# Patient Record
Sex: FEMALE | Race: WHITE | NOT HISPANIC OR LATINO | URBAN - METROPOLITAN AREA
[De-identification: names, ages, dates, MRNs, and addresses within clinical notes are randomized per-mention and may not be internally consistent; named-entity substitution may affect disease eponyms.]

---

## 2017-02-17 ENCOUNTER — TELEPHONE (OUTPATIENT)
Dept: INTERNAL MEDICINE | Facility: CLINIC | Age: 52
End: 2017-02-17

## 2017-02-17 DIAGNOSIS — E78.5 HYPERLIPIDEMIA, UNSPECIFIED HYPERLIPIDEMIA TYPE: Primary | ICD-10-CM

## 2017-02-17 LAB
ALBUMIN SERPL-MCNC: 4.2 G/DL (ref 3.5–5.2)
ALBUMIN/GLOB SERPL: 1.5 G/DL
ALP SERPL-CCNC: 67 U/L (ref 39–117)
ALT SERPL-CCNC: 18 U/L (ref 1–33)
AST SERPL-CCNC: 15 U/L (ref 1–32)
BILIRUB SERPL-MCNC: 0.3 MG/DL (ref 0.1–1.2)
BUN SERPL-MCNC: 17 MG/DL (ref 6–20)
BUN/CREAT SERPL: 21.5 (ref 7–25)
CALCIUM SERPL-MCNC: 9.7 MG/DL (ref 8.6–10.5)
CHLORIDE SERPL-SCNC: 103 MMOL/L (ref 98–107)
CHOLEST SERPL-MCNC: 233 MG/DL (ref 0–200)
CO2 SERPL-SCNC: 25.9 MMOL/L (ref 22–29)
CREAT SERPL-MCNC: 0.79 MG/DL (ref 0.57–1)
GLOBULIN SER CALC-MCNC: 2.8 GM/DL
GLUCOSE SERPL-MCNC: 90 MG/DL (ref 65–99)
HDLC SERPL-MCNC: 68 MG/DL (ref 40–60)
LDLC SERPL CALC-MCNC: 146 MG/DL (ref 0–100)
LDLC/HDLC SERPL: 2.15 {RATIO}
POTASSIUM SERPL-SCNC: 4.4 MMOL/L (ref 3.5–5.2)
PROT SERPL-MCNC: 7 G/DL (ref 6–8.5)
SODIUM SERPL-SCNC: 142 MMOL/L (ref 136–145)
TRIGL SERPL-MCNC: 95 MG/DL (ref 0–150)
TSH SERPL DL<=0.005 MIU/L-ACNC: 2.89 MIU/ML (ref 0.27–4.2)
VLDLC SERPL CALC-MCNC: 19 MG/DL (ref 5–40)

## 2017-02-17 NOTE — TELEPHONE ENCOUNTER
LABS ORDERED    ----- Message from Janis Dominguez MD sent at 2/16/2017  8:23 AM EST -----  [facility]p and t[facility]  ----- Message -----     From: Brea Fu MA     Sent: 2/16/2017   7:40 AM       To: Janis Dominguez MD    PT IS SCHEDULED FOR LABS PLEASE ADVISE

## 2017-02-21 ENCOUNTER — OFFICE VISIT (OUTPATIENT)
Dept: INTERNAL MEDICINE | Facility: CLINIC | Age: 52
End: 2017-02-21

## 2017-02-21 VITALS
SYSTOLIC BLOOD PRESSURE: 124 MMHG | BODY MASS INDEX: 34.71 KG/M2 | HEART RATE: 74 BPM | HEIGHT: 64 IN | OXYGEN SATURATION: 98 % | DIASTOLIC BLOOD PRESSURE: 72 MMHG | WEIGHT: 203.3 LBS

## 2017-02-21 DIAGNOSIS — J45.30 MILD PERSISTENT ASTHMA WITHOUT COMPLICATION: ICD-10-CM

## 2017-02-21 DIAGNOSIS — F41.1 GENERALIZED ANXIETY DISORDER: ICD-10-CM

## 2017-02-21 DIAGNOSIS — K21.9 GASTROESOPHAGEAL REFLUX DISEASE, ESOPHAGITIS PRESENCE NOT SPECIFIED: ICD-10-CM

## 2017-02-21 DIAGNOSIS — E78.00 HYPERCHOLESTEROLEMIA: Primary | ICD-10-CM

## 2017-02-21 DIAGNOSIS — M25.512 LEFT SHOULDER PAIN, UNSPECIFIED CHRONICITY: ICD-10-CM

## 2017-02-21 PROCEDURE — 99214 OFFICE O/P EST MOD 30 MIN: CPT | Performed by: INTERNAL MEDICINE

## 2017-02-21 PROCEDURE — 90471 IMMUNIZATION ADMIN: CPT | Performed by: INTERNAL MEDICINE

## 2017-02-21 PROCEDURE — 90732 PPSV23 VACC 2 YRS+ SUBQ/IM: CPT | Performed by: INTERNAL MEDICINE

## 2017-02-21 RX ORDER — ESTRADIOL 0.5 MG/1
0.5 TABLET ORAL DAILY
Qty: 90 TABLET | Refills: 3 | Status: SHIPPED | OUTPATIENT
Start: 2017-02-21 | End: 2018-05-03

## 2017-02-21 RX ORDER — MELOXICAM 7.5 MG/1
7.5 TABLET ORAL DAILY
Qty: 30 TABLET | Refills: 2 | Status: SHIPPED | OUTPATIENT
Start: 2017-02-21 | End: 2017-06-17 | Stop reason: SDUPTHER

## 2017-02-21 NOTE — PROGRESS NOTES
Subjective   Shelbi Fleming is a 52 y.o. female here today to f/u on hyperlipidemia.      History of Present Illness   Pt has been compliant with meds for GERD.  No sx as long as pt takes medicine as prescribed.  No epigastric pain or reflux sx  She has stopped her atorvastatin as she did not want to take it  No trouble with it  She has has been on estrace since her hysterectomy.  She has been taking effexor qod and does well with this  She has been stable with her advair inhaler  She does have some left shoulder pain for 6 months  Seems to come and go.  No night pain.  Worse with reaching back or over her head    The following portions of the patient's history were reviewed and updated as appropriate: allergies, current medications, past medical history, past social history and problem list.  She has been working on diet and exercise    Review of Systems   All other systems reviewed and are negative.      Objective   Physical Exam   Constitutional: She is oriented to person, place, and time. She appears well-developed and well-nourished.   HENT:   Head: Normocephalic and atraumatic.   Right Ear: External ear normal.   Left Ear: External ear normal.   Mouth/Throat: Oropharynx is clear and moist.   Eyes: Conjunctivae and EOM are normal. Pupils are equal, round, and reactive to light.   Neck: Normal range of motion. No tracheal deviation present. No thyromegaly present.   Cardiovascular: Normal rate, regular rhythm, normal heart sounds and intact distal pulses.    Pulmonary/Chest: Effort normal and breath sounds normal.   Abdominal: Soft. Bowel sounds are normal. She exhibits no distension. There is no tenderness.   Musculoskeletal: Normal range of motion. She exhibits no edema or deformity.   Neurological: She is alert and oriented to person, place, and time.   Skin: Skin is warm and dry.   Psychiatric: She has a normal mood and affect. Her behavior is normal. Judgment and thought content normal.   Vitals  reviewed.      Vitals:    02/21/17 1352   BP: 124/72   Pulse: 74   SpO2: 98%        Telephone on 02/17/2017   Component Date Value Ref Range Status   • Glucose 02/17/2017 90  65 - 99 mg/dL Final   • BUN 02/17/2017 17  6 - 20 mg/dL Final   • Creatinine 02/17/2017 0.79  0.57 - 1.00 mg/dL Final   • eGFR Non  Am 02/17/2017 76  >60 mL/min/1.73 Final   • eGFR African Am 02/17/2017 92  >60 mL/min/1.73 Final   • BUN/Creatinine Ratio 02/17/2017 21.5  7.0 - 25.0 Final   • Sodium 02/17/2017 142  136 - 145 mmol/L Final   • Potassium 02/17/2017 4.4  3.5 - 5.2 mmol/L Final   • Chloride 02/17/2017 103  98 - 107 mmol/L Final   • Total CO2 02/17/2017 25.9  22.0 - 29.0 mmol/L Final   • Calcium 02/17/2017 9.7  8.6 - 10.5 mg/dL Final   • Total Protein 02/17/2017 7.0  6.0 - 8.5 g/dL Final   • Albumin 02/17/2017 4.20  3.50 - 5.20 g/dL Final   • Globulin 02/17/2017 2.8  gm/dL Final   • A/G Ratio 02/17/2017 1.5  g/dL Final   • Total Bilirubin 02/17/2017 0.3  0.1 - 1.2 mg/dL Final   • Alkaline Phosphatase 02/17/2017 67  39 - 117 U/L Final   • AST (SGOT) 02/17/2017 15  1 - 32 U/L Final   • ALT (SGPT) 02/17/2017 18  1 - 33 U/L Final   • Total Cholesterol 02/17/2017 233* 0 - 200 mg/dL Final   • Triglycerides 02/17/2017 95  0 - 150 mg/dL Final   • HDL Cholesterol 02/17/2017 68* 40 - 60 mg/dL Final   • VLDL Cholesterol 02/17/2017 19  5 - 40 mg/dL Final   • LDL Cholesterol  02/17/2017 146* 0 - 100 mg/dL Final   • LDL/HDL Ratio 02/17/2017 2.15   Final   • TSH 02/17/2017 2.89  0.27 - 4.2 mIU/mL Final       Current Outpatient Prescriptions:   •  albuterol (PROAIR HFA) 108 (90 BASE) MCG/ACT inhaler, Inhale 2 puffs every 6 (six) hours as needed for wheezing., Disp: 18 g, Rfl: 3  •  cetirizine (ZyrTEC) 10 MG tablet, Take 10 mg by mouth daily., Disp: , Rfl:   •  Cholecalciferol (VITAMIN D3) 2000 UNITS tablet, Take 2 tablets by mouth daily., Disp: , Rfl:   •  estradiol (ESTRACE) 0.5 MG tablet, Take 1 tablet by mouth Daily., Disp: 90 tablet, Rfl:  3  •  fluticasone-salmeterol (ADVAIR DISKUS) 100-50 MCG/DOSE DISKUS, Inhale 1 puff 2 (two) times a day., Disp: 60 each, Rfl: 3  •  Multiple Vitamin (MULTI VITAMIN DAILY PO), Take 1 tablet by mouth daily., Disp: , Rfl:   •  venlafaxine XR (EFFEXOR-XR) 37.5 MG 24 hr capsule, Take 1 capsule by mouth daily., Disp: , Rfl:   •  esomeprazole (nexIUM) 20 MG capsule, Take 2 capsules by mouth Every Morning Before Breakfast., Disp: 30 capsule, Rfl: 2  •  meloxicam (MOBIC) 7.5 MG tablet, Take 1 tablet by mouth Daily., Disp: 30 tablet, Rfl: 2      Assessment/Plan   Diagnoses and all orders for this visit:    Hypercholesterolemia    Gastroesophageal reflux disease, esophagitis presence not specified    Mild persistent asthma without complication    Generalized anxiety disorder    Left shoulder pain, unspecified chronicity  -     Ambulatory Referral to Physical Therapy Evaluate and treat  -     meloxicam (MOBIC) 7.5 MG tablet; Take 1 tablet by mouth Daily.    Other orders  -     esomeprazole (nexIUM) 20 MG capsule; Take 2 capsules by mouth Every Morning Before Breakfast.  -     estradiol (ESTRACE) 0.5 MG tablet; Take 1 tablet by mouth Daily.        1. Left shoulder pain- she is going to try PT and mobic.  She will let me know she is not improving and we'll send her to see the orthopedic surgeon  2. GERD-She is taking 2 OTC as her insurance will not cover  3. HPL-She is going to really work on diet and exercise and recheck  4. Anxiety-patient has done very well taking the Effexor every other day.  We are going to continue this  5.  Post menopause: Patient has been on Estrace for a few years.  We did discuss discontinuing it in the future but for now she is going to continue taking it as it is a low dose.

## 2017-03-07 ENCOUNTER — APPOINTMENT (OUTPATIENT)
Dept: PHYSICAL THERAPY | Facility: HOSPITAL | Age: 52
End: 2017-03-07

## 2017-03-15 ENCOUNTER — HOSPITAL ENCOUNTER (OUTPATIENT)
Dept: PHYSICAL THERAPY | Facility: HOSPITAL | Age: 52
Setting detail: THERAPIES SERIES
Discharge: HOME | End: 2017-03-15

## 2017-03-15 DIAGNOSIS — M77.8 SHOULDER TENDONITIS, LEFT: ICD-10-CM

## 2017-03-15 DIAGNOSIS — M25.512 CHRONIC LEFT SHOULDER PAIN: Primary | ICD-10-CM

## 2017-03-15 DIAGNOSIS — G89.29 CHRONIC LEFT SHOULDER PAIN: Primary | ICD-10-CM

## 2017-03-15 PROCEDURE — 97161 PT EVAL LOW COMPLEX 20 MIN: CPT

## 2017-03-15 PROCEDURE — 97530 THERAPEUTIC ACTIVITIES: CPT

## 2017-03-15 NOTE — PROGRESS NOTES
Outpatient Physical Therapy Ortho Initial Evaluation  [facility]     Patient Name: Shelbi Fleming  : 1965  MRN: [id number]  Today's Date: 3/15/2017      Visit Date: 03/15/2017    Patient Active Problem List   Diagnosis   • Atopic rhinitis   • Asthma   • Generalized anxiety disorder   • Gastroesophageal reflux disease   • Hypercholesterolemia   • Low back pain   • Adiposity   • Post-menopause        Past Medical History   Diagnosis Date   • Anxiety    • Asthma    • Elevated blood pressure reading without diagnosis of hypertension    • Hypercholesteremia    • Hyperglycemia    • Obesity    • Vitamin D deficiency         Past Surgical History   Procedure Laterality Date   • Colonoscopy     • Breast biopsy     • Hysterectomy         Visit Dx:     ICD-10-CM ICD-9-CM   1. Chronic left shoulder pain M25.512 719.41    G89.29 338.29   2. Shoulder tendonitis, left M75.82 726.10             Patient History       03/15/17 1400          History    Chief Complaint Pain;Muscle weakness;Difficulty with daily activities  -GJ (r) KM (t) GJ (c)      Type of Pain Shoulder pain;Upper Extremity / Arm  -GJ (r) KM (t) GJ (c)      Date Current Problem(s) Began --   Intermittent for 1 yr  -GJ (r) KM (t) GJ (c)      Brief Description of Current Complaint Ms. Fleming is a 52 y.o. right handed female with a one year history of intermittent left shoulder pain. She stated problems with dressing, bathing, and grooming using the LUE. She has been using the RUE for her ADL's, and described a similar pain is beginning in her RUE.  She stated that her pain is currently 3/10 and worsens with shoulder elevation. She found that heat and ibuprofen help to decrease her pain.  She is also taking a muscle relaxer and has noticed it helping. Ms. Fleming denied any red flags, diabetes, or thyroid issues. She also denied any numbness and tingling in either upper extremity. She has not received any imaging or physical therapy for her current condition.  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  -GJ (r) KM (t) GJ (c)      Previous treatment for THIS PROBLEM Medication  -GJ (r) KM (t) GJ (c)      Onset Date- PT 3/15/17  -GJ (r) KM (t) GJ (c)      Patient/Caregiver Goals Relieve pain;Return to prior level of function  -GJ (r) KM (t) GJ (c)      Hand Dominance right-handed  -GJ (r) KM (t) GJ (c)      Occupation/sports/leisure activities babysit  -GJ (r) KM (t) GJ (c)      Patient seeing anyone else for problem(s)? No  -GJ (r) KM (t) GJ (c)      Pain     Pain Location Shoulder;Arm  -GJ (r) KM (t) GJ (c)      Pain at Present 3  -GJ (r) KM (t) GJ (c)      Pain at Best 2  -GJ (r) KM (t) GJ (c)      Pain at Worst 7  -GJ (r) KM (t) GJ (c)      Pain Frequency Intermittent  -GJ (r) KM (t) GJ (c)      Fall Risk Assessment    Any falls in the past year: No  -GJ (r) KM (t) GJ (c)      Daily Activities    Primary Language English  -GJ (r) KM (t) GJ (c)      Are you able to read Yes  -GJ (r) KM (t) GJ (c)      Are you able to write Yes  -GJ (r) KM (t) GJ (c)      How does patient learn best? Reading  -GJ (r) KM (t) GJ (c)      Teaching needs identified Management of Condition;Home Exercise Program  -GJ (r) KM (t) GJ (c)      Does patient have problems with the following? Anxiety  -GJ (r) KM (t) GJ (c)      Barriers to learning None  -GJ (r) KM (t) GJ (c)      Functional Status bathing;dressing;grooming  -GJ (r) KM (t) GJ (c)      Pt Participated in POC and Goals Yes  -GJ (r) KM (t) GJ (c)      Safety    Are you being hurt, hit, or frightened by anyone at home or in your life? No  -GJ (r) KM (t) GJ (c)      Are you being neglected by a caregiver No  -GJ (r) KM (t) ILENE (c)        User Key  (r) = Recorded By, (t) = Taken By, (c) = Cosigned By    Initials Name Provider Type    ILENE Ahumada, PT Physical Therapist    TABATHA Diza, PT Student PT Student                PT Ortho       03/15/17  1500    Subjective Pain    Able to rate subjective pain? yes  -GJ (r) KM (t) GJ (c)    Pre-Treatment Pain Level 3  -GJ (r) KM (t) GJ (c)    Posture/Observations    Alignment Options Forward head;Rounded shoulders;Scapular elevation  -GJ (r) KM (t) GJ (c)    Forward Head Moderate  -GJ (r) KM (t) GJ (c)    Rounded Shoulders Bilateral:;Moderate  -GJ (r) KM (t) GJ (c)    Scapular Elevation Bilateral:;Mild  -GJ (r) KM (t) GJ (c)    Quarter Clearing    Quarter Clearing Upper Quarter Clearing  -GJ (r) KM (t) GJ (c)    DTR- Upper Quarter Clearing    Biceps (C5/6) Bilateral:;2- Normal response  -GJ (r) KM (t) GJ (c)    Brachioradialis (C6) Bilateral:;1- Minimal response  -GJ (r) KM (t) GJ (c)    Triceps (C7) Bilateral:;1- Minimal response  -GJ (r) KM (t) GJ (c)    Myotomal Screen- Upper Quarter Clearing    Shoulder flexion (C5) Bilateral:;5 (Normal)  -GJ (r) KM (t) GJ (c)    Elbow flexion/wrist extension (C6) Bilateral:;5 (Normal)  -GJ (r) KM (t) GJ (c)    Elbow extension/wrist flexion (C7) Bilateral:;5 (Normal)  -GJ (r) KM (t) GJ (c)    Finger flexion/ (C8) Bilateral:;5 (Normal)  -GJ (r) KM (t) GJ (c)    Finger abduction (T1) Bilateral:;5 (Normal)  -GJ (r) KM (t) GJ (c)    Cervical/Shoulder ROM Screen    Cervical flexion Normal  -GJ (r) KM (t) GJ (c)    Cervical extension Normal  -GJ (r) KM (t) GJ (c)    Cervical rotation Normal  -GJ (r) KM (t) GJ (c)    Shoulder elevation  Normal  -GJ (r) KM (t) GJ (c)    Special Tests/Palpation    Special Tests/Palpation Shoulder  -GJ (r) KM (t) GJ (c)    Shoulder Girdle Palpation    Supraspinatus Insertion Bilateral:   WNL  -GJ (r) KM (t) GJ (c)    Long Head of Biceps Bilateral:   WNL  -GJ (r) KM (t) GJ (c)    Deltoid Bilateral:   WNL  -GJ (r) KM (t) GJ (c)    Infraspinatus Bilateral:   WNL  -GJ (r) KM (t) GJ (c)    Teres Minor Bilateral:   WNL  -GJ (r) KM (t) GJ (c)    Upper Trap Bilateral:;Guarded/taut  -GJ (r) KM (t) GJ (c)    Rhomboid Bilateral:   WNL  -GJ (r) KM (t) GJ (c)     Shoulder Girdle Palpation? Yes  -GJ (r) KM (t) GJ (c)    Shoulder Impingement/Rotator Cuff Special Tests    Coronel-Rich Test (RC Lesion vs. Bursitis) Bilateral:;Negative  -GJ (r) KM (t) GJ (c)    Neer Impingement Test (RC Lesion vs. Bursitis) Bilateral:;Negative  -GJ (r) KM (t) GJ (c)    Full Can Test (RC Lesion) Bilateral:;Negative  -GJ (r) KM (t) GJ (c)    Empty Can Test (RC Lesion) Bilateral:;Negative  -GJ (r) KM (t) GJ (c)    Speed's Test (LH of Biceps Lesion) Bilateral:;Negative  -GJ (r) KM (t) GJ (c)    ROM (Range of Motion)    General ROM upper extremity range of motion deficits identified  -GJ (r) KM (t) GJ (c)    Left Shoulder    Flexion AROM Deficit 150, seated  -GJ (r) KM (t) GJ (c)    Flexion PROM Deficit 165, supine  -GJ (r) KM (t) GJ (c)    ABduction AROM Deficit 120, seated  -GJ (r) KM (t) GJ (c)    ABduction PROM Deficit 155, supine  -GJ (r) KM (t) GJ (c)    External Rotation PROM Deficit 70, supine  -GJ (r) KM (t) GJ (c)    Internal Rotation AROM Deficit --   FIR midline lumbar spine, sitting  -GJ (r) KM (t) GJ (c)    Internal Rotation PROM Deficit 82, supine  -GJ (r) KM (t) GJ (c)    Right Shoulder    Flexion AROM Deficit 164, seated  -GJ (r) KM (t) GJ (c)    Flexion PROM Deficit 179, supine  -GJ (r) KM (t) GJ (c)    ABduction AROM Deficit 172, seated  -GJ (r) KM (t) GJ (c)    ABduction PROM Deficit 180, supine  -GJ (r) KM (t) GJ (c)    External Rotation PROM Deficit 84, supine  -GJ (r) KM (t) GJ (c)    Internal Rotation PROM Deficit 65, supine  -GJ (r) KM (t) GJ (c)    General UE Assessment    ROM shoulder, left: UE ROM deficit;shoulder, right: UE ROM deficit  -GJ (r) KM (t) GJ (c)    MMT (Manual Muscle Testing)    General MMT Assessment upper extremity strength deficits identified  -GJ (r) KM (t) GJ (c)    Left Shoulder    Int Rotation Gross Movement (5/5) normal  -GJ (r) KM (t) GJ (c)    Ext Rotation Gross Movement (5/5) normal  -GJ (r) KM (t) GJ (c)    Right Shoulder    Int Rotation Gross  Movement (5/5) normal  -GJ (r) KM (t) GJ (c)    Ext Rotation Gross Movement (5/5) normal  -GJ (r) KM (t) GJ (c)    Upper Extremity    Upper Ext Manual Muscle Testing left shoulder strength deficit;right shoulder strength deficit  -GJ (r) KM (t) GJ (c)    Flexibility    Flexibility Tested? Upper Extremity  -GJ (r) KM (t) GJ (c)      User Key  (r) = Recorded By, (t) = Taken By, (c) = Cosigned By    Initials Name Provider Type    ILENE Ahumada, PT Physical Therapist    TABATHA Diaz, PT Student PT Student                            Therapy Education       03/15/17 1520          Therapy Education    Given HEP;Symptoms/condition management;Pain management;Posture/body mechanics;Mobility training   Discussed diagnosis, prognosis, and physiology of condtion using a shoulder model. Instructed pt to not perform excessive activity with her LUE. Provided HEP which included scapular strengthening and proper arthrokinematic movement ofshoulder.  -GJ (r) KM (t) GJ (c)      Program New  -GJ (r) KM (t) GJ (c)      How Provided Verbal;Written;Demonstration  -GJ (r) KM (t) GJ (c)      Provided to Patient  -GJ (r) KM (t) GJ (c)      Level of Understanding Teach back education performed;Verbalized;Demonstrated  -GJ (r) KM (t) GJ (c)        User Key  (r) = Recorded By, (t) = Taken By, (c) = Cosigned By    Initials Name Provider Type    ILENE Ahumada, PT Physical Therapist    TABATHA Diaz, PT Student PT Student                PT OP Goals       03/15/17 1500       PT Short Term Goals    STG Date to Achieve 03/29/17  -GJ (r) KM (t) GJ (c)     STG 1 Pt demonstrates 160 deg of active L shoulder flexion with no pain for ease of bathing/grooming.  -GJ (r) KM (t) GJ (c)     STG 1 Progress New  -GJ (r) KM (t) GJ (c)     STG 2 Pt demonstrates independence in postural self correction of head and shoulders for better ergonomics when working.  -GJ (r) KM (t) GJ (c)     STG 2 Progress New  -GJ (r) KM (t) GJ (c)     STG 3 Pt will  demonstrate a score of </=30% on the DASH for greater mobility with ADLs.  -GJ     STG 3 Progress New  -GJ (r) KM (t) GJ (c)     Long Term Goals    LTG Date to Achieve 04/14/17  -GJ (r) KM (t) GJ (c)     LTG 1 Pt self reports >/= 50% improvement in ADLs/function for enhanced community participation.  -GJ (r) KM (t) GJ (c)     LTG 1 Progress New  -GJ (r) KM (t) GJ (c)     LTG 2 Pt demonstrates independence in advanced HEP for greater ease and safety when performing leisure activities.  -GJ (r) KM (t) GJ (c)     LTG 2 Progress New  -GJ (r) KM (t) GJ (c)     LTG 3 Pt demonstrates 170 deg of active L shoulder flexion with no pain for greater participation in ADLs.  -GJ (r) KM (t) GJ (c)     LTG 3 Progress New  -GJ (r) KM (t) GJ (c)     Time Calculation    PT Goal Re-Cert Due Date 04/14/17  -GJ (r) KM (t) GJ (c)       User Key  (r) = Recorded By, (t) = Taken By, (c) = Cosigned By    Initials Name Provider Type    ILENE Ahumada, PT Physical Therapist    TABATHA Diaz, PT Student PT Student                PT Assessment/Plan       03/15/17 1531       PT Assessment    Functional Limitations Decreased safety during functional activities;Limitations in community activities;Limitations in functional capacity and performance;Performance in work activities;Performance in leisure activities;Limitation in home management;Performance in self-care ADL  -GJ     Impairments Impaired muscle endurance;Impaired muscle length;Impaired postural alignment;Joint integrity;Muscle strength;Range of motion;Pain;Poor body mechanics;Posture;Joint mobility  -GJ     Assessment Comments Ms. Fleming is a 52 y.o. right handed female with a one year history of intermittent left shoulder pain. She stated problems with dressing, bathing, and grooming using the LUE. She has been using the RUE for her ADL's, and described a similar pain is beginning in her RUE.  She stated that her pain is currently 3/10 and worsens with shoulder elevation. She found  that heat and ibuprofen help to decrease her pain.  She is also taking a muscle relaxer and has noticed it helping. Ms. Fleming denied any red flags, diabetes, or thyroid issues. She also denied any numbness and tingling in either upper extremity. She has not received any imaging or physical therapy for her current condition. 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  Ms. Fleming presents with a forward head and rounded shoulders in sitting and standing.  Upper quarter myotomes and reflexes are normal. She has decreased AROM and PROM (see specific joints) and demonstrates difficultly donning and doffing her coat/shirt.  Functionally, she was able to elevate her arm above her head, but it took an increased amount of time, and she had increased pain the longer she had it elevated. Her DASH score was 42.5% (0- 100% where 0% is no perceived disability). Ms. Fleming demonstrates s/s consistent with tendonitis of the left shoulder due to limited range and increased pain.  This inhibits her ability to care for herself (grooming, bathing, or lifting) and perform her working responsibilities.  She may benefit from skilled physical therapy for strengthening of the scapular muscles, stretching of anterior chest muscles, better positioning of the humeral head in the glenoid fossa for improved arthrokinematics, and education regarding her condition and proper lifting techniques.  -ILENE (r) TABATHA (t) ILENE (c)     Please refer to paper survey for additional self-reported information Yes  -GJ     Rehab Potential Excellent  -GJ     Patient/caregiver participated in establishment of treatment plan and goals Yes  -GJ     Patient would benefit from skilled therapy intervention Yes  -GJ     PT Plan    PT Frequency 2x/week  -GJ     Predicted Duration of Therapy Intervention (days/wks) 3-4 weeks  -GJ (r) KM (t) ILENE (c)     Planned CPT's? PT EVAL LOW COMPLEXITY: 54404;PT  THER PROC EA 15 MIN: 94851;PT THER ACT EA 15 MIN: 13507;PT NEUROMUSC RE-EDUCATION EA 15 MIN: 94559;PT MANUAL THERAPY EA 15 MIN: 59704;PT EVAL AQUA: 52225;PT AQUATIC THERAPY EA 15 MIN: 97905;PT ELECTRICAL STIM UNATTEND: ;PT ULTRASOUND EA 15 MIN: 12370;PT HOT/COLD PACK WC NONMCARE: 23223  -GJ     PT Plan Comments Will assess pt's response to exercises. Warm up 5 min on arm bike. Shoulder pulley, posterior shoulder rolls, rows, shoulder extension, seated UT stretch. T spine PA mobs. Modalities PRN.  -GJ (r) KM (t) GJ (c)       User Key  (r) = Recorded By, (t) = Taken By, (c) = Cosigned By    Initials Name Provider Type    ILENE Ahumada, PT Physical Therapist    TABATHA Diaz, PT Student PT Student                  Exercises       03/15/17 1500          Subjective Pain    Able to rate subjective pain? yes  -GJ (r) KM (t) GJ (c)      Pre-Treatment Pain Level 3  -GJ (r) KM (t) GJ (c)      Exercise 1    Exercise Name 1 Wash the wall, standing  -GJ (r) KM (t) GJ (c)      Cueing 1 Demo;Verbal  -GJ (r) KM (t) GJ (c)      Equipment 1 --   Towel  -GJ (r) KM (t) GJ (c)      Sets 1 1  -GJ (r) KM (t) GJ (c)      Reps 1 10  -GJ (r) KM (t) GJ (c)      Exercise 2    Exercise Name 2 Posterior shoulder rolls, standing  -GJ (r) KM (t) GJ (c)      Cueing 2 Demo;Verbal  -GJ (r) KM (t) GJ (c)      Sets 2 1  -GJ (r) KM (t) GJ (c)      Reps 2 10  -GJ (r) KM (t) GJ (c)      Exercise 3    Exercise Name 3 Scapular squeezes, standing, no UE involvement  -GJ (r) KM (t) GJ (c)      Cueing 3 Tactile;Demo;Verbal  -GJ (r) KM (t) GJ (c)      Sets 3 1  -GJ (r) KM (t) GJ (c)      Reps 3 10  -GJ (r) KM (t) GJ (c)      Exercise 4    Exercise Name 4 Door frame stretch, standing, arms low  -GJ (r) KM (t) GJ (c)      Cueing 4 Demo;Verbal  -GJ (r) KM (t) GJ (c)      Reps 4 3  -GJ (r) KM (t) GJ (c)      Time (Seconds) 4 20  -GJ (r) KM (t) GJ (c)        User Key  (r) = Recorded By, (t) = Taken By, (c) = Cosigned By    Initials Name Provider Type     ILENE Ahumada, PT Physical Therapist    TABATHA Diaz, PT Student PT Student                              Outcome Measures       03/15/17 1500          DASH    Open a tight or new jar. --   42.5 total score  -ILENE (r) KM (t) ILENE (c)      Functional Assessment    Outcome Measure Options Disabilities of the Arm, Shoulder, and Hand (DASH)  -GJ (r) KM (t) ILENE (c)        User Key  (r) = Recorded By, (t) = Taken By, (c) = Cosigned By    Initials Name Provider Type    ILENE Ahumada, PT Physical Therapist    TABATHA Diaz, PT Student PT Student            Time Calculation:   Start Time: 1105  Stop Time: 1205  Time Calculation (min): 60 min     Therapy Charges for Today     Code Description Service Date Service Provider Modifiers Qty    [id number]  PT EVAL LOW COMPLEXITY 3 3/15/2017 Nay Diaz, PT Student GP 1    [id number]  PT THERAPEUTIC ACT EA 15 MIN 3/15/2017 Nay Diaz, PT Student GP 1          PT G-Codes  Outcome Measure Options: Disabilities of the Arm, Shoulder, and Hand (DASH)         Nay Diaz PT Student  3/15/2017

## 2017-03-22 ENCOUNTER — APPOINTMENT (OUTPATIENT)
Dept: PHYSICAL THERAPY | Facility: HOSPITAL | Age: 52
End: 2017-03-22

## 2017-03-24 ENCOUNTER — HOSPITAL ENCOUNTER (OUTPATIENT)
Dept: PHYSICAL THERAPY | Facility: HOSPITAL | Age: 52
Setting detail: THERAPIES SERIES
Discharge: HOME | End: 2017-03-24

## 2017-03-24 DIAGNOSIS — M77.8 SHOULDER TENDONITIS, LEFT: ICD-10-CM

## 2017-03-24 DIAGNOSIS — G89.29 CHRONIC LEFT SHOULDER PAIN: Primary | ICD-10-CM

## 2017-03-24 DIAGNOSIS — M25.512 CHRONIC LEFT SHOULDER PAIN: Primary | ICD-10-CM

## 2017-03-24 PROCEDURE — 97140 MANUAL THERAPY 1/> REGIONS: CPT

## 2017-03-24 PROCEDURE — 97110 THERAPEUTIC EXERCISES: CPT

## 2017-03-24 PROCEDURE — 97150 GROUP THERAPEUTIC PROCEDURES: CPT

## 2017-03-24 NOTE — PROGRESS NOTES
Outpatient Physical Therapy Ortho Treatment Note  [facility]     Patient Name: Shelbi Fleming  : 1965  MRN: [id number]  Today's Date: 3/24/2017      Visit Date: 2017    Visit Dx:    ICD-10-CM ICD-9-CM   1. Chronic left shoulder pain M25.512 719.41    G89.29 338.29   2. Shoulder tendonitis, left M75.82 726.10       Patient Active Problem List   Diagnosis   • Atopic rhinitis   • Asthma   • Generalized anxiety disorder   • Gastroesophageal reflux disease   • Hypercholesterolemia   • Low back pain   • Adiposity   • Post-menopause        Past Medical History:   Diagnosis Date   • Anxiety    • Asthma    • Elevated blood pressure reading without diagnosis of hypertension    • Hypercholesteremia    • Hyperglycemia    • Obesity    • Vitamin D deficiency         Past Surgical History:   Procedure Laterality Date   • BREAST BIOPSY     • COLONOSCOPY     • HYSTERECTOMY                               PT Assessment/Plan       17 1250       PT Assessment    Assessment Comments Ms. Fleming has been recovering from a cold so has not been performing exercises at home as frequently as should be.  Pt demonstrated increased functional L shoulder flexion during pulley exercises.  Incorporated scapular and rotator cuff strengthening exercises in today's session and pt tolerated well.  Pt had decreased mid-thoracic spine movement during prone PA mobs.  -ILENE (r) KM (t) ILENE (c)     PT Plan    PT Plan Comments Assess pt's response to treatment. Continue with resisted ER, horizontal ABD, standing clocks, attempt T,W,Ys, AAROM in supine with cane, walking hands up wall, pulley, thoracic PAs, UT stretch, modalities PRN. Measure AROM/PROM.  -GJ (r) KM (t) ILENE (c)       User Key  (r) = Recorded By, (t) = Taken By, (c) = Cosigned By    Initials Name Provider Type    ILENE Ahumada, PT Physical Therapist    TABATHA Diaz, PT Student PT Student                    Exercises       17 1000          Subjective Comments     Subjective Comments I've been sick, so I havent been doing my exercises a lot.   -GJ (r) KM (t) GJ (c)      Subjective Pain    Able to rate subjective pain? yes  -GJ (r) KM (t) GJ (c)      Pre-Treatment Pain Level 3  -GJ (r) KM (t) GJ (c)      Exercise 5    Exercise Name 5 UBE  -GJ (r) KM (t) GJ (c)      Cueing 5 Verbal  -GJ (r) KM (t) GJ (c)      Time (Minutes) 5 5  -GJ (r) KM (t) GJ (c)      Exercise 6    Exercise Name 6 Pulleys  -GJ (r) KM (t) GJ (c)      Cueing 6 Verbal  -GJ (r) KM (t) GJ (c)      Equipment 6 Pulley  -GJ (r) KM (t) GJ (c)      Reps 6 20  -GJ (r) KM (t) GJ (c)      Exercise 7    Exercise Name 7 Seated UT stretch (sitting on hand and laterally flexing away)  -GJ (r) KM (t) GJ (c)      Cueing 7 Verbal  -GJ (r) KM (t) GJ (c)      Reps 7 3  -GJ (r) KM (t) GJ (c)      Time (Seconds) 7 20  -GJ (r) KM (t) GJ (c)      Exercise 8    Exercise Name 8 Standing rows  -GJ (r) KM (t) GJ (c)      Cueing 8 Demo  -GJ (r) KM (t) GJ (c)      Equipment 8 Theraband  -GJ (r) KM (t) GJ (c)      Resistance 8 Red  -GJ (r) KM (t) GJ (c)      Sets 8 2  -GJ (r) KM (t) GJ (c)      Reps 8 10  -GJ (r) KM (t) GJ (c)      Exercise 9    Exercise Name 9 Standing shoulder ext  -GJ (r) KM (t) GJ (c)      Cueing 9 Demo  -GJ (r) KM (t) GJ (c)      Equipment 9 Theraband  -GJ (r) KM (t) GJ (c)      Resistance 9 Red  -GJ (r) KM (t) GJ (c)      Sets 9 2  -GJ (r) KM (t) GJ (c)      Reps 9 10  -GJ (r) KM (t) GJ (c)      Exercise 10    Exercise Name 10 Resisted ER standing  -GJ (r) KM (t) GJ (c)      Cueing 10 Demo  -GJ (r) KM (t) GJ (c)      Equipment 10 Theraband  -GJ (r) KM (t) GJ (c)      Resistance 10 Red  -GJ (r) KM (t) GJ (c)      Sets 10 2  -GJ (r) KM (t) GJ (c)      Reps 10 10  -GJ (r) KM (t) GJ (c)      Exercise 11    Exercise Name 11 Standing resisted horizontal ABD  -GJ (r) KM (t) GJ (c)      Cueing 11 Demo  -GJ (r) KM (t) GJ (c)      Equipment 11 Theraband  -GJ (r) KM (t) GJ (c)      Resistance 11 Red  -GJ (r) KM (t) GJ (c)       Sets 11 2  -GJ (r) KM (t) GJ (c)      Reps 11 10  -GJ (r) KM (t) GJ (c)        User Key  (r) = Recorded By, (t) = Taken By, (c) = Cosigned By    Initials Name Provider Type    ILENE Ahumada, PT Physical Therapist    TABATHA Diaz, PT Student PT Student                        Manual Rx (last 36 hours)      Manual Treatments       03/24/17 1100          Manual Rx 1    Manual Rx 1 Location Thoracic PA mobs (grades 2-3). Prone with nose in nose hole, feet on bolster  -GJ (r) KM (t) GJ (c)        User Key  (r) = Recorded By, (t) = Taken By, (c) = Cosigned By    Initials Name Provider Type    ILENE Ahumada, PT Physical Therapist    TABATHA Diaz, PT Student PT Student                PT OP Goals       03/24/17 1200       PT Short Term Goals    STG Date to Achieve 03/29/17  -GJ (r) KM (t) GJ (c)     STG 1 Pt demonstrates 160 deg of active L shoulder flexion with no pain for ease of bathing/grooming.  -GJ (r) KM (t) GJ (c)     STG 1 Progress Ongoing  -GJ (r) KM (t) GJ (c)     STG 2 Pt demonstrates independence in postural self correction of head and shoulders for better ergonomics when working.  -GJ (r) KM (t) GJ (c)     STG 2 Progress Ongoing  -GJ (r) KM (t) GJ (c)     STG 3 Pt will demonstrate a score of </=30% on the DASH for greater mobility with ADLs.  -GJ (r) KM (t) GJ (c)     STG 3 Progress Ongoing  -GJ (r) KM (t) GJ (c)     Long Term Goals    LTG Date to Achieve 04/14/17  -GJ (r) KM (t) GJ (c)     LTG 1 Pt self reports >/= 50% improvement in ADLs/function for enhanced community participation.  -GJ (r) KM (t) GJ (c)     LTG 1 Progress Ongoing  -GJ (r) KM (t) GJ (c)     LTG 2 Pt demonstrates independence in advanced HEP for greater ease and safety when performing leisure activities.  -GJ (r) KM (t) GJ (c)     LTG 2 Progress Ongoing  -GJ (r) KM (t) GJ (c)     LTG 3 Pt demonstrates 170 deg of active L shoulder flexion with no pain for greater participation in ADLs.  -GJ (r) KM (t) GJ (c)     LTG 3  Progress Ongoing  -GJ (r) KM (t) GJ (c)       User Key  (r) = Recorded By, (t) = Taken By, (c) = Cosigned By    Initials Name Provider Type    ILENE Ahumada, PT Physical Therapist    TABATHA Diaz PT Student PT Student                Therapy Education       03/24/17 1247          Therapy Education    Given HEP;Symptoms/condition management;Pain management;Posture/body mechanics   Reviewed HEP   -GJ (r) KM (t) GJ (c)      Program Reinforced  -GJ (r) KM (t) GJ (c)      How Provided Verbal  -GJ (r) KM (t) GJ (c)      Provided to Patient  -GJ (r) KM (t) GJ (c)      Level of Understanding Verbalized  -GJ (r) KM (t) GJ (c)        User Key  (r) = Recorded By, (t) = Taken By, (c) = Cosigned By    Initials Name Provider Type    ILENE Ahumada, PT Physical Therapist    TABATHA Diaz, PT Student PT Student                Time Calculation:   Start Time: 1050  Stop Time: 1120  Time Calculation (min): 30 min    Therapy Charges for Today     Code Description Service Date Service Provider Modifiers Qty    69769059793 HC PT MANUAL THERAPY EA 15 MIN 3/24/2017 Nay Diaz PT Student GP 1    01917961607 HC PT THER PROC EA 15 MIN 3/24/2017 Nay Diaz PT Student GP 1                    Nay Diaz PT Student  3/24/2017

## 2017-03-29 ENCOUNTER — APPOINTMENT (OUTPATIENT)
Dept: PHYSICAL THERAPY | Facility: HOSPITAL | Age: 52
End: 2017-03-29

## 2017-03-31 ENCOUNTER — HOSPITAL ENCOUNTER (OUTPATIENT)
Dept: PHYSICAL THERAPY | Facility: HOSPITAL | Age: 52
Setting detail: THERAPIES SERIES
Discharge: HOME | End: 2017-03-31

## 2017-03-31 DIAGNOSIS — M77.8 SHOULDER TENDONITIS, LEFT: ICD-10-CM

## 2017-03-31 DIAGNOSIS — G89.29 CHRONIC LEFT SHOULDER PAIN: Primary | ICD-10-CM

## 2017-03-31 DIAGNOSIS — M25.512 CHRONIC LEFT SHOULDER PAIN: Primary | ICD-10-CM

## 2017-03-31 PROCEDURE — 97110 THERAPEUTIC EXERCISES: CPT

## 2017-03-31 PROCEDURE — 97140 MANUAL THERAPY 1/> REGIONS: CPT

## 2017-04-04 ENCOUNTER — HOSPITAL ENCOUNTER (OUTPATIENT)
Dept: PHYSICAL THERAPY | Facility: HOSPITAL | Age: 52
Setting detail: THERAPIES SERIES
Discharge: HOME | End: 2017-04-04

## 2017-04-04 DIAGNOSIS — G89.29 CHRONIC LEFT SHOULDER PAIN: Primary | ICD-10-CM

## 2017-04-04 DIAGNOSIS — M25.512 CHRONIC LEFT SHOULDER PAIN: Primary | ICD-10-CM

## 2017-04-04 DIAGNOSIS — M77.8 SHOULDER TENDONITIS, LEFT: ICD-10-CM

## 2017-04-04 PROCEDURE — 97110 THERAPEUTIC EXERCISES: CPT

## 2017-04-04 PROCEDURE — 97140 MANUAL THERAPY 1/> REGIONS: CPT

## 2017-04-04 NOTE — PROGRESS NOTES
Outpatient Physical Therapy Ortho Treatment Note  [facility]     Patient Name: Shelbi Fleming  : 1965  MRN: [id number]  Today's Date: 2017      Visit Date: 2017    Visit Dx:    ICD-10-CM ICD-9-CM   1. Chronic left shoulder pain M25.512 719.41    G89.29 338.29   2. Shoulder tendonitis, left M75.82 726.10       Patient Active Problem List   Diagnosis   • Atopic rhinitis   • Asthma   • Generalized anxiety disorder   • Gastroesophageal reflux disease   • Hypercholesterolemia   • Low back pain   • Adiposity   • Post-menopause        Past Medical History:   Diagnosis Date   • Anxiety    • Asthma    • Elevated blood pressure reading without diagnosis of hypertension    • Hypercholesteremia    • Hyperglycemia    • Obesity    • Vitamin D deficiency         Past Surgical History:   Procedure Laterality Date   • BREAST BIOPSY     • COLONOSCOPY     • HYSTERECTOMY               PT Ortho       17 1300    Right Shoulder    Flexion AROM Deficit 165, seated  -GJ (r) KM (t) GJ (c)    ABduction AROM Deficit 158, seated  -GJ (r) KM (t) GJ (c)    Internal Rotation AROM Deficit FIR midline T10-12  -GJ (r) KM (t) GJ (c)      User Key  (r) = Recorded By, (t) = Taken By, (c) = Cosigned By    Initials Name Provider Type    ILENE Ahumada, PT Physical Therapist    TABATHA Diaz, PT Student PT Student                            PT Assessment/Plan       17 9329       PT Assessment    Assessment Comments Ms. Fleming demonstrates full PROM of L shoulder but has pain at end ranges.  Possibility of impingement when elevating arm into upper ranges and behind the back.  She was not tender to palpation on the L biceps tendon at proximal insertion. Progressed strengthening exercises today, and pt tolerated well.  Clarified what exercises she should be performing at home.  -GJ (r) KM (t) GJ (c)     PT Plan    PT Plan Comments Assess pt's response to therapy.  Continue with strengthening scapular muscles.   Serratus punches, PNF D2 agonist reversal gently. Modalities PRN  -GJ (r) KM (t) GJ (c)       User Key  (r) = Recorded By, (t) = Taken By, (c) = Cosigned By    Initials Name Provider Type    ILENE Ahumada, PT Physical Therapist    TABATHA Diaz, PT Student PT Student                Modalities       04/04/17 1300          Ice    Patient denies application of Ice Yes  -GJ        User Key  (r) = Recorded By, (t) = Taken By, (c) = Cosigned By    Initials Name Provider Type    ILENE Ahumada, PT Physical Therapist                Exercises       04/04/17 1300          Subjective Comments    Subjective Comments My shoulder and arm have been constantly achy since I started the bands at home. I started taking the Meloxicam again.  -GJ (r) KM (t) GJ (c)      Subjective Pain    Able to rate subjective pain? yes  -GJ (r) KM (t) GJ (c)      Pre-Treatment Pain Level 4  -GJ (r) KM (t) GJ (c)      Exercise 4    Exercise Name 4 Door frame stretch, standing, arms low  -GJ (r) KM (t) GJ (c)      Cueing 4 Demo;Verbal  -GJ (r) KM (t) GJ (c)      Reps 4 3  -GJ (r) KM (t) GJ (c)      Time (Seconds) 4 20  -GJ (r) KM (t) GJ (c)      Exercise 5    Exercise Name 5 UBE  -GJ (r) KM (t) GJ (c)      Cueing 5 Verbal  -GJ (r) KM (t) GJ (c)      Time (Minutes) 5 5  -GJ (r) KM (t) GJ (c)      Exercise 6    Exercise Name 6 Pulleys  -GJ (r) KM (t) GJ (c)      Cueing 6 Verbal  -GJ (r) KM (t) GJ (c)      Equipment 6 Pulley  -GJ (r) KM (t) GJ (c)      Reps 6 20  -GJ (r) KM (t) GJ (c)      Exercise 8    Exercise Name 8 Standing rows  -GJ (r) KM (t) GJ (c)      Cueing 8 Demo  -GJ (r) KM (t) GJ (c)      Equipment 8 Theraband  -GJ (r) KM (t) GJ (c)      Resistance 8 Red  -GJ (r) KM (t) GJ (c)      Sets 8 2  -GJ (r) KM (t) GJ (c)      Reps 8 15  -GJ (r) KM (t) GJ (c)      Exercise 9    Exercise Name 9 Standing shoulder ext  -GJ (r) KM (t) GJ (c)      Cueing 9 Demo  -GJ (r) KM (t) GJ (c)      Equipment 9 Theraband  -GJ (r) KM (t) GJ (c)       Resistance 9 Red  -GJ (r) KM (t) GJ (c)      Sets 9 2  -GJ (r) KM (t) GJ (c)      Reps 9 15  -GJ (r) KM (t) GJ (c)      Exercise 10    Exercise Name 10 Resisted ER standing  -GJ (r) KM (t) GJ (c)      Cueing 10 Demo  -GJ (r) KM (t) GJ (c)      Equipment 10 Theraband  -GJ (r) KM (t) GJ (c)      Resistance 10 Red  -GJ (r) KM (t) GJ (c)      Sets 10 2  -GJ (r) KM (t) GJ (c)      Reps 10 15  -GJ (r) KM (t) GJ (c)      Exercise 11    Exercise Name 11 Standing resisted horizontal ABD  -GJ (r) KM (t) GJ (c)      Cueing 11 Demo  -GJ (r) KM (t) GJ (c)      Equipment 11 Theraband  -GJ (r) KM (t) GJ (c)      Resistance 11 Red  -GJ (r) KM (t) GJ (c)      Sets 11 2  -GJ (r) KM (t) GJ (c)      Reps 11 15  -GJ (r) KM (t) GJ (c)      Exercise 12    Exercise Name 12 Rainbows on mirror with towel LUE  -GJ (r) KM (t) GJ (c)      Cueing 12 Demo  -GJ (r) KM (t) GJ (c)      Reps 12 15  -GJ (r) KM (t) GJ (c)      Exercise 13    Exercise Name 13 Resisted walking hands up wall  -GJ (r) KM (t) GJ (c)      Cueing 13 Demo  -GJ (r) KM (t) GJ (c)      Equipment 13 Theraband  -GJ (r) KM (t) GJ (c)      Resistance 13 Yellow  -GJ (r) KM (t) GJ (c)      Reps 13 10  -GJ (r) KM (t) GJ (c)      Exercise 14    Exercise Name 14 Standing lat pull downs  -GJ (r) KM (t) GJ (c)      Cueing 14 Demo  -GJ (r) KM (t) GJ (c)      Equipment 14 Theraband  -GJ (r) KM (t) GJ (c)      Resistance 14 Green  -GJ (r) KM (t) GJ (c)      Sets 14 2  -GJ (r) KM (t) GJ (c)      Reps 14 10  -GJ (r) KM (t) GJ (c)      Exercise 15    Exercise Name 15 Rhythmic stabilization LUE flexed to 90, supine. Manual resistance provided by therapist  -GJ (r) KM (t) GJ (c)      Cueing 15 Tactile  -GJ (r) KM (t) GJ (c)      Reps 15 3  -GJ (r) KM (t) GJ (c)      Time (Seconds) 15 30  -GJ (r) KM (t) GJ (c)        User Key  (r) = Recorded By, (t) = Taken By, (c) = Cosigned By    Initials Name Provider Type    ILENE Ahumada, PT Physical Therapist    TABATHA Diaz, PT Student PT Student                         Manual Rx (last 36 hours)      Manual Treatments       04/04/17 1500          Manual Rx 2    Manual Rx 2 Location LUE PROM flex,ABD, ER/IR in supine  -GJ (r) KM (t) GJ (c)        User Key  (r) = Recorded By, (t) = Taken By, (c) = Cosigned By    Initials Name Provider Type    ILENE Ahumada, PT Physical Therapist    KM Nay Diaz, PT Student PT Student                PT OP Goals       04/04/17 1300       PT Short Term Goals    STG Date to Achieve 03/29/17  -GJ (r) KM (t) GJ (c)     STG 1 Pt demonstrates 160 deg of active L shoulder flexion with no pain for ease of bathing/grooming.  -GJ (r) KM (t) GJ (c)     STG 1 Progress Met  -GJ (r) KM (t) GJ (c)     STG 1 Progress Comments 165 deg of active L shoulder flexion  -GJ (r) KM (t) GJ (c)     STG 2 Pt demonstrates independence in postural self correction of head and shoulders for better ergonomics when working.  -GJ (r) KM (t) GJ (c)     STG 2 Progress Ongoing  -GJ (r) KM (t) GJ (c)     STG 3 Pt will demonstrate a score of </=30% on the DASH for greater mobility with ADLs.  -GJ (r) KM (t) GJ (c)     STG 3 Progress Ongoing  -GJ (r) KM (t) GJ (c)     Long Term Goals    LTG Date to Achieve 04/14/17  -GJ (r) KM (t) GJ (c)     LTG 1 Pt self reports >/= 50% improvement in ADLs/function for enhanced community participation.  -GJ (r) KM (t) GJ (c)     LTG 1 Progress Ongoing  -GJ (r) KM (t) GJ (c)     LTG 1 Progress Comments pt reports some improvement in condition, but not 50%  -GJ (r) KM (t) GJ (c)     LTG 2 Pt demonstrates independence in advanced HEP for greater ease and safety when performing leisure activities.  -GJ (r) KM (t) GJ (c)     LTG 2 Progress Ongoing  -GJ (r) KM (t) GJ (c)     LTG 3 Pt demonstrates 170 deg of active L shoulder flexion with no pain for greater participation in ADLs.  -GJ (r) KM (t) GJ (c)     LTG 3 Progress Ongoing  -GJ (r) KM (t) GJ (c)       User Key  (r) = Recorded By, (t) = Taken By, (c) = Cosigned By    Initials  Name Provider Type    ILENE Ahumada, PT Physical Therapist    TABATHA Diaz, PT Student PT Student                Therapy Education       04/04/17 1526          Therapy Education    Given HEP;Symptoms/condition management;Pain management   Instructed pt to perform band exercises once every other day and added resisted ER and rows. Informed pt to call her PCP about making an appointment regarding possible injection into L shoulder.  Discussed with pt that her range is fine...  -GJ (r) KM (t) GJ (c)      Program Progressed   ...but she is just limited by pain.   -GJ (r) KM (t) GJ (c)      How Provided Verbal;Written  -GJ (r) KM (t) GJ (c)      Provided to Patient  -GJ (r) KM (t) GJ (c)      Level of Understanding Verbalized  -GJ (r) KM (t) GJ (c)        User Key  (r) = Recorded By, (t) = Taken By, (c) = Cosigned By    Initials Name Provider Type    ILENE Ahumada, PT Physical Therapist    TABATHA Diaz, PT Student PT Student                Time Calculation:   Start Time: 1350  Stop Time: 1436  Time Calculation (min): 46 min    Therapy Charges for Today     Code Description Service Date Service Provider Modifiers Qty    06608527513  PT THER PROC EA 15 MIN 4/4/2017 Nay Diaz PT Student GP 2    17894997337 HC PT MANUAL THERAPY EA 15 MIN 4/4/2017 Nay Diaz PT Student GP 1                    Nay Diaz PT Student  4/4/2017

## 2017-04-07 ENCOUNTER — APPOINTMENT (OUTPATIENT)
Dept: PHYSICAL THERAPY | Facility: HOSPITAL | Age: 52
End: 2017-04-07

## 2017-04-11 ENCOUNTER — APPOINTMENT (OUTPATIENT)
Dept: PHYSICAL THERAPY | Facility: HOSPITAL | Age: 52
End: 2017-04-11

## 2017-04-14 ENCOUNTER — APPOINTMENT (OUTPATIENT)
Dept: PHYSICAL THERAPY | Facility: HOSPITAL | Age: 52
End: 2017-04-14

## 2017-04-18 ENCOUNTER — APPOINTMENT (OUTPATIENT)
Dept: PHYSICAL THERAPY | Facility: HOSPITAL | Age: 52
End: 2017-04-18

## 2017-04-21 ENCOUNTER — APPOINTMENT (OUTPATIENT)
Dept: PHYSICAL THERAPY | Facility: HOSPITAL | Age: 52
End: 2017-04-21

## 2017-05-01 ENCOUNTER — DOCUMENTATION (OUTPATIENT)
Dept: PHYSICAL THERAPY | Facility: HOSPITAL | Age: 52
End: 2017-05-01

## 2017-05-01 DIAGNOSIS — G89.29 CHRONIC LEFT SHOULDER PAIN: Primary | ICD-10-CM

## 2017-05-01 DIAGNOSIS — M25.512 CHRONIC LEFT SHOULDER PAIN: Primary | ICD-10-CM

## 2017-05-01 DIAGNOSIS — M77.8 SHOULDER TENDONITIS, LEFT: ICD-10-CM

## 2017-06-06 ENCOUNTER — APPOINTMENT (OUTPATIENT)
Dept: WOMENS IMAGING | Facility: HOSPITAL | Age: 52
End: 2017-06-06

## 2017-06-06 PROCEDURE — G0279 TOMOSYNTHESIS, MAMMO: HCPCS | Performed by: RADIOLOGY

## 2017-06-06 PROCEDURE — 77062 BREAST TOMOSYNTHESIS BI: CPT | Performed by: RADIOLOGY

## 2017-06-06 PROCEDURE — 77066 DX MAMMO INCL CAD BI: CPT | Performed by: RADIOLOGY

## 2017-06-06 PROCEDURE — G0204 DX MAMMO INCL CAD BI: HCPCS | Performed by: RADIOLOGY

## 2017-06-17 DIAGNOSIS — M25.512 LEFT SHOULDER PAIN, UNSPECIFIED CHRONICITY: ICD-10-CM

## 2017-06-19 RX ORDER — MELOXICAM 7.5 MG/1
TABLET ORAL
Qty: 30 TABLET | Refills: 5 | Status: SHIPPED | OUTPATIENT
Start: 2017-06-19 | End: 2018-05-03

## 2017-08-21 ENCOUNTER — RESULTS ENCOUNTER (OUTPATIENT)
Dept: INTERNAL MEDICINE | Facility: CLINIC | Age: 52
End: 2017-08-21

## 2017-08-21 DIAGNOSIS — E78.00 HYPERCHOLESTEROLEMIA: ICD-10-CM

## 2017-10-24 ENCOUNTER — TELEPHONE (OUTPATIENT)
Dept: INTERNAL MEDICINE | Facility: CLINIC | Age: 52
End: 2017-10-24

## 2017-10-24 RX ORDER — VENLAFAXINE HYDROCHLORIDE 37.5 MG/1
37.5 CAPSULE, EXTENDED RELEASE ORAL DAILY
Qty: 90 CAPSULE | Refills: 0 | Status: SHIPPED | OUTPATIENT
Start: 2017-10-24 | End: 2018-05-03 | Stop reason: SDUPTHER

## 2017-10-24 NOTE — TELEPHONE ENCOUNTER
----- Message from Miroslava Humphrey sent at 10/24/2017  9:48 AM EDT -----  Regarding: MED REFILL  Patient needs a refill on venlafaxine 37.5. She uses [facility] and needs 90 day supply. Thanks    RX SENT TO PHARMACY

## 2018-05-01 LAB
ALBUMIN SERPL-MCNC: 4.8 G/DL (ref 3.5–5.2)
ALBUMIN/GLOB SERPL: 1.8 G/DL
ALP SERPL-CCNC: 71 U/L (ref 39–117)
ALT SERPL-CCNC: 24 U/L (ref 1–33)
AST SERPL-CCNC: 18 U/L (ref 1–32)
BILIRUB SERPL-MCNC: 0.3 MG/DL (ref 0.1–1.2)
BUN SERPL-MCNC: 16 MG/DL (ref 6–20)
BUN/CREAT SERPL: 19 (ref 7–25)
CALCIUM SERPL-MCNC: 10.3 MG/DL (ref 8.6–10.5)
CHLORIDE SERPL-SCNC: 102 MMOL/L (ref 98–107)
CHOLEST SERPL-MCNC: 240 MG/DL (ref 0–200)
CO2 SERPL-SCNC: 26.5 MMOL/L (ref 22–29)
CREAT SERPL-MCNC: 0.84 MG/DL (ref 0.57–1)
GFR SERPLBLD CREATININE-BSD FMLA CKD-EPI: 70 ML/MIN/1.73
GFR SERPLBLD CREATININE-BSD FMLA CKD-EPI: 85 ML/MIN/1.73
GLOBULIN SER CALC-MCNC: 2.6 GM/DL
GLUCOSE SERPL-MCNC: 100 MG/DL (ref 65–99)
HCV AB S/CO SERPL IA: <0.1 S/CO RATIO (ref 0–0.9)
HDLC SERPL-MCNC: 68 MG/DL (ref 40–60)
LDLC SERPL CALC-MCNC: 150 MG/DL (ref 0–100)
LDLC/HDLC SERPL: 2.2 {RATIO}
POTASSIUM SERPL-SCNC: 4.6 MMOL/L (ref 3.5–5.2)
PROT SERPL-MCNC: 7.4 G/DL (ref 6–8.5)
SODIUM SERPL-SCNC: 142 MMOL/L (ref 136–145)
TRIGL SERPL-MCNC: 112 MG/DL (ref 0–150)
VLDLC SERPL CALC-MCNC: 22.4 MG/DL (ref 5–40)

## 2018-05-03 ENCOUNTER — OFFICE VISIT (OUTPATIENT)
Dept: INTERNAL MEDICINE | Facility: CLINIC | Age: 53
End: 2018-05-03

## 2018-05-03 VITALS
DIASTOLIC BLOOD PRESSURE: 74 MMHG | HEART RATE: 72 BPM | HEIGHT: 64 IN | BODY MASS INDEX: 32.47 KG/M2 | WEIGHT: 190.2 LBS | TEMPERATURE: 98 F | SYSTOLIC BLOOD PRESSURE: 128 MMHG | OXYGEN SATURATION: 98 %

## 2018-05-03 DIAGNOSIS — K21.9 GASTROESOPHAGEAL REFLUX DISEASE, ESOPHAGITIS PRESENCE NOT SPECIFIED: ICD-10-CM

## 2018-05-03 DIAGNOSIS — Z00.00 HEALTHCARE MAINTENANCE: Primary | ICD-10-CM

## 2018-05-03 DIAGNOSIS — F41.1 GENERALIZED ANXIETY DISORDER: ICD-10-CM

## 2018-05-03 DIAGNOSIS — Z12.39 SCREENING FOR BREAST CANCER: ICD-10-CM

## 2018-05-03 PROCEDURE — 93000 ELECTROCARDIOGRAM COMPLETE: CPT | Performed by: INTERNAL MEDICINE

## 2018-05-03 PROCEDURE — 99396 PREV VISIT EST AGE 40-64: CPT | Performed by: INTERNAL MEDICINE

## 2018-05-03 RX ORDER — VENLAFAXINE HYDROCHLORIDE 37.5 MG/1
37.5 CAPSULE, EXTENDED RELEASE ORAL DAILY
Qty: 90 CAPSULE | Refills: 3 | Status: SHIPPED | OUTPATIENT
Start: 2018-05-03 | End: 2019-04-17 | Stop reason: SDUPTHER

## 2018-05-03 NOTE — PROGRESS NOTES
"Subjective   Shelbi Fleming is a 53 y.o. female and is here for a comprehensive physical exam. The patient reports problems - gerd.  Pt has been compliant with meds for GERD.  No sx as long as pt takes medicine as prescribed.  No epigastric pain or reflux sx  She is doing well with effexor    Pt is UTD with annual gyn exam and mammo     Do you take any herbs or supplements that were not prescribed by a doctor? She does take vit D      Social History: SHe is eating less sugar   No reg exercise    Social History     Social History   • Marital status:      Spouse name: Owen Fleming   • Number of children: 0   • Years of education: N/A     Occupational History   • HOMEMAKER      Social History Main Topics   • Smoking status: Never Smoker   • Smokeless tobacco: Not on file   • Alcohol use Yes   • Drug use: No   • Sexual activity: Not on file     Other Topics Concern   • Not on file     Social History Narrative   • No narrative on file       Family History:   Family History   Problem Relation Age of Onset   • Hypertension Mother    • Multiple sclerosis Maternal Grandmother    • Lung cancer Maternal Grandfather    • Breast cancer Paternal Grandmother    • Heart attack Paternal Grandfather        Past Medical History:   Past Medical History:   Diagnosis Date   • Anxiety    • Asthma    • Elevated blood pressure reading without diagnosis of hypertension    • Hypercholesteremia    • Hyperglycemia    • Obesity    • Vitamin D deficiency            Review of Systems    A comprehensive review of systems was negative.    Objective   /74 (BP Location: Left arm, Patient Position: Sitting)   Pulse 72   Temp 98 °F (36.7 °C) (Oral)   Ht 162.6 cm (64\")   Wt 86.3 kg (190 lb 3.2 oz)   SpO2 98%   BMI 32.65 kg/m²     General Appearance:    Alert, cooperative, no distress, appears stated age   Head:    Normocephalic, without obvious abnormality, atraumatic   Eyes:    PERRL, conjunctiva/corneas clear, EOM's intact, fundi     " benign, both eyes   Ears:    Normal TM's and external ear canals, both ears   Nose:   Nares normal, septum midline, mucosa normal, no drainage    or sinus tenderness   Throat:   Lips, mucosa, and tongue normal; teeth and gums normal   Neck:   Supple, symmetrical, trachea midline, no adenopathy;     thyroid:  no enlargement/tenderness/nodules; no carotid    bruit or JVD   Back:     Symmetric, no curvature, ROM normal, no CVA tenderness   Lungs:     Clear to auscultation bilaterally, respirations unlabored   Chest Wall:    No tenderness or deformity    Heart:    Regular rate and rhythm, S1 and S2 normal, no murmur, rub   or gallop       Abdomen:     Soft, non-tender, bowel sounds active all four quadrants,     no masses, no organomegaly           Extremities:   Extremities normal, atraumatic, no cyanosis or edema   Pulses:   2+ and symmetric all extremities   Skin:   Skin color, texture, turgor normal, no rashes or lesions   Lymph nodes:   Cervical, supraclavicular, and axillary nodes normal   Neurologic:   CNII-XII intact, normal strength, sensation and reflexes     throughout       Medications:   Current Outpatient Prescriptions:   •  albuterol (PROAIR HFA) 108 (90 BASE) MCG/ACT inhaler, Inhale 2 puffs every 6 (six) hours as needed for wheezing., Disp: 18 g, Rfl: 3  •  cetirizine (ZyrTEC) 10 MG tablet, Take 10 mg by mouth daily., Disp: , Rfl:   •  Cholecalciferol (VITAMIN D3) 2000 UNITS tablet, Take 2 tablets by mouth daily., Disp: , Rfl:   •  esomeprazole (nexIUM) 20 MG capsule, Take 2 capsules by mouth Every Morning Before Breakfast., Disp: 30 capsule, Rfl: 2  •  fluticasone-salmeterol (ADVAIR DISKUS) 100-50 MCG/DOSE DISKUS, Inhale 1 puff 2 (two) times a day., Disp: 60 each, Rfl: 3  •  Multiple Vitamin (MULTI VITAMIN DAILY PO), Take 1 tablet by mouth daily., Disp: , Rfl:   •  venlafaxine XR (EFFEXOR-XR) 37.5 MG 24 hr capsule, Take 1 capsule by mouth Daily., Disp: 90 capsule, Rfl: 0     EKG: Normal sinus rhythm  without any acute ST changes.  There is no baseline available for comparison.      Assessment/Plan   Healthy female exam.      1. Healthcare Maintenance:  2. Patient Counseling:  --Nutrition: Stressed importance of moderation in sodium/caffeine intake, saturated fat and cholesterol, caloric balance, sufficient intake of fresh fruits, vegetables, fiber, calcium and vit D  --Exercise:I have rec 30 minutes a day  --Substance Abuse: no tob no etoh  --Dental health: she does go to the dentist reg  --Immunizations reviewed.  utd  --Discussed benefits of screening colonoscopy.  3. GERD-  She is doing well with current meds  4. Anxiety-  She is doing well effexor qod

## 2018-08-02 ENCOUNTER — APPOINTMENT (OUTPATIENT)
Dept: WOMENS IMAGING | Facility: HOSPITAL | Age: 53
End: 2018-08-02

## 2018-08-02 PROCEDURE — 77063 BREAST TOMOSYNTHESIS BI: CPT | Performed by: RADIOLOGY

## 2018-08-02 PROCEDURE — 77067 SCR MAMMO BI INCL CAD: CPT | Performed by: RADIOLOGY

## 2018-08-06 DIAGNOSIS — Z12.39 SCREENING FOR BREAST CANCER: ICD-10-CM

## 2019-04-17 RX ORDER — VENLAFAXINE HYDROCHLORIDE 37.5 MG/1
37.5 CAPSULE, EXTENDED RELEASE ORAL DAILY
Qty: 90 CAPSULE | Refills: 1 | Status: SHIPPED | OUTPATIENT
Start: 2019-04-17 | End: 2019-11-16 | Stop reason: SDUPTHER

## 2019-05-03 ENCOUNTER — RESULTS ENCOUNTER (OUTPATIENT)
Dept: INTERNAL MEDICINE | Facility: CLINIC | Age: 54
End: 2019-05-03

## 2019-05-03 DIAGNOSIS — F41.1 GENERALIZED ANXIETY DISORDER: ICD-10-CM

## 2019-05-03 DIAGNOSIS — K21.9 GASTROESOPHAGEAL REFLUX DISEASE, ESOPHAGITIS PRESENCE NOT SPECIFIED: ICD-10-CM

## 2019-09-03 ENCOUNTER — APPOINTMENT (OUTPATIENT)
Dept: WOMENS IMAGING | Facility: HOSPITAL | Age: 54
End: 2019-09-03

## 2019-09-03 PROCEDURE — 77067 SCR MAMMO BI INCL CAD: CPT | Performed by: RADIOLOGY

## 2019-09-03 PROCEDURE — 77063 BREAST TOMOSYNTHESIS BI: CPT | Performed by: RADIOLOGY

## 2019-11-18 RX ORDER — VENLAFAXINE HYDROCHLORIDE 37.5 MG/1
37.5 CAPSULE, EXTENDED RELEASE ORAL DAILY
Qty: 90 CAPSULE | Refills: 1 | Status: SHIPPED | OUTPATIENT
Start: 2019-11-18 | End: 2020-07-29

## 2020-03-12 LAB
ALBUMIN SERPL-MCNC: 4.3 G/DL (ref 3.5–5.2)
ALBUMIN/GLOB SERPL: 1.7 G/DL
ALP SERPL-CCNC: 68 U/L (ref 39–117)
ALT SERPL-CCNC: 17 U/L (ref 1–33)
AST SERPL-CCNC: 11 U/L (ref 1–32)
BASOPHILS # BLD AUTO: 0.05 10*3/MM3 (ref 0–0.2)
BASOPHILS NFR BLD AUTO: 1 % (ref 0–1.5)
BILIRUB SERPL-MCNC: 0.3 MG/DL (ref 0.2–1.2)
BUN SERPL-MCNC: 17 MG/DL (ref 6–20)
BUN/CREAT SERPL: 21.5 (ref 7–25)
CALCIUM SERPL-MCNC: 9.5 MG/DL (ref 8.6–10.5)
CHLORIDE SERPL-SCNC: 103 MMOL/L (ref 98–107)
CHOLEST SERPL-MCNC: 237 MG/DL (ref 0–200)
CO2 SERPL-SCNC: 27 MMOL/L (ref 22–29)
CREAT SERPL-MCNC: 0.79 MG/DL (ref 0.57–1)
EOSINOPHIL # BLD AUTO: 0.43 10*3/MM3 (ref 0–0.4)
EOSINOPHIL NFR BLD AUTO: 8.4 % (ref 0.3–6.2)
ERYTHROCYTE [DISTWIDTH] IN BLOOD BY AUTOMATED COUNT: 12.8 % (ref 12.3–15.4)
GLOBULIN SER CALC-MCNC: 2.5 GM/DL
GLUCOSE SERPL-MCNC: 105 MG/DL (ref 65–99)
HCT VFR BLD AUTO: 41.1 % (ref 34–46.6)
HDLC SERPL-MCNC: 61 MG/DL (ref 40–60)
HGB BLD-MCNC: 13.6 G/DL (ref 12–15.9)
IMM GRANULOCYTES # BLD AUTO: 0.04 10*3/MM3 (ref 0–0.05)
IMM GRANULOCYTES NFR BLD AUTO: 0.8 % (ref 0–0.5)
LDLC SERPL CALC-MCNC: 152 MG/DL (ref 0–100)
LDLC/HDLC SERPL: 2.49 {RATIO}
LYMPHOCYTES # BLD AUTO: 1.85 10*3/MM3 (ref 0.7–3.1)
LYMPHOCYTES NFR BLD AUTO: 36.1 % (ref 19.6–45.3)
MCH RBC QN AUTO: 28 PG (ref 26.6–33)
MCHC RBC AUTO-ENTMCNC: 33.1 G/DL (ref 31.5–35.7)
MCV RBC AUTO: 84.7 FL (ref 79–97)
MONOCYTES # BLD AUTO: 0.4 10*3/MM3 (ref 0.1–0.9)
MONOCYTES NFR BLD AUTO: 7.8 % (ref 5–12)
NEUTROPHILS # BLD AUTO: 2.35 10*3/MM3 (ref 1.7–7)
NEUTROPHILS NFR BLD AUTO: 45.9 % (ref 42.7–76)
NRBC BLD AUTO-RTO: 0 /100 WBC (ref 0–0.2)
PLATELET # BLD AUTO: 288 10*3/MM3 (ref 140–450)
POTASSIUM SERPL-SCNC: 4.3 MMOL/L (ref 3.5–5.2)
PROT SERPL-MCNC: 6.8 G/DL (ref 6–8.5)
RBC # BLD AUTO: 4.85 10*6/MM3 (ref 3.77–5.28)
SODIUM SERPL-SCNC: 141 MMOL/L (ref 136–145)
TRIGL SERPL-MCNC: 121 MG/DL (ref 0–150)
TSH SERPL DL<=0.005 MIU/L-ACNC: 3.16 UIU/ML (ref 0.27–4.2)
VLDLC SERPL CALC-MCNC: 24.2 MG/DL
WBC # BLD AUTO: 5.12 10*3/MM3 (ref 3.4–10.8)

## 2020-03-12 RX ORDER — ALBUTEROL SULFATE 90 UG/1
2 AEROSOL, METERED RESPIRATORY (INHALATION) EVERY 6 HOURS PRN
Qty: 3 INHALER | Refills: 0 | Status: SHIPPED | OUTPATIENT
Start: 2020-03-12 | End: 2020-07-29

## 2020-05-19 RX ORDER — VENLAFAXINE HYDROCHLORIDE 37.5 MG/1
37.5 CAPSULE, EXTENDED RELEASE ORAL DAILY
Qty: 90 CAPSULE | Refills: 3 | OUTPATIENT
Start: 2020-05-19

## 2020-07-29 RX ORDER — ALBUTEROL SULFATE 90 UG/1
AEROSOL, METERED RESPIRATORY (INHALATION)
Qty: 24 G | Refills: 3 | Status: SHIPPED | OUTPATIENT
Start: 2020-07-29

## 2020-07-29 RX ORDER — VENLAFAXINE HYDROCHLORIDE 37.5 MG/1
37.5 CAPSULE, EXTENDED RELEASE ORAL DAILY
Qty: 90 CAPSULE | Refills: 3 | Status: SHIPPED | OUTPATIENT
Start: 2020-07-29

## 2021-03-26 ENCOUNTER — BULK ORDERING (OUTPATIENT)
Dept: CASE MANAGEMENT | Facility: OTHER | Age: 56
End: 2021-03-26

## 2021-03-26 DIAGNOSIS — Z23 IMMUNIZATION DUE: ICD-10-CM

## 2021-07-06 RX ORDER — VENLAFAXINE HYDROCHLORIDE 37.5 MG/1
37.5 CAPSULE, EXTENDED RELEASE ORAL DAILY
Qty: 90 CAPSULE | Refills: 3 | OUTPATIENT
Start: 2021-07-06